# Patient Record
Sex: FEMALE | Race: WHITE | URBAN - METROPOLITAN AREA
[De-identification: names, ages, dates, MRNs, and addresses within clinical notes are randomized per-mention and may not be internally consistent; named-entity substitution may affect disease eponyms.]

---

## 2017-01-01 ENCOUNTER — MEDICAL CORRESPONDENCE (OUTPATIENT)
Dept: HEALTH INFORMATION MANAGEMENT | Facility: CLINIC | Age: 52
End: 2017-01-01

## 2017-01-01 ENCOUNTER — TELEPHONE (OUTPATIENT)
Dept: NEUROLOGY | Facility: CLINIC | Age: 52
End: 2017-01-01

## 2017-01-01 ENCOUNTER — OFFICE VISIT (OUTPATIENT)
Dept: NEUROLOGY | Facility: CLINIC | Age: 52
End: 2017-01-01

## 2017-01-01 ENCOUNTER — TRANSFERRED RECORDS (OUTPATIENT)
Dept: HEALTH INFORMATION MANAGEMENT | Facility: CLINIC | Age: 52
End: 2017-01-01

## 2017-01-01 ENCOUNTER — ALLIED HEALTH/NURSE VISIT (OUTPATIENT)
Dept: NEUROLOGY | Facility: CLINIC | Age: 52
End: 2017-01-01

## 2017-01-01 VITALS
SYSTOLIC BLOOD PRESSURE: 137 MMHG | HEART RATE: 94 BPM | WEIGHT: 222.6 LBS | BODY MASS INDEX: 38 KG/M2 | OXYGEN SATURATION: 96 % | DIASTOLIC BLOOD PRESSURE: 91 MMHG | HEIGHT: 64 IN

## 2017-01-01 DIAGNOSIS — Z71.9 VISIT FOR COUNSELING: Primary | ICD-10-CM

## 2017-01-01 DIAGNOSIS — G12.21 ALS (AMYOTROPHIC LATERAL SCLEROSIS) (H): ICD-10-CM

## 2017-01-01 DIAGNOSIS — G12.21 ALS (AMYOTROPHIC LATERAL SCLEROSIS) (H): Primary | ICD-10-CM

## 2017-01-01 LAB
EXPTIME-PRE: 7.23 SEC
FEF2575-%PRED-PRE: 83 %
FEF2575-PRE: 2.18 L/SEC
FEF2575-PRED: 2.6 L/SEC
FEFMAX-%PRED-PRE: 83 %
FEFMAX-PRE: 5.52 L/SEC
FEFMAX-PRED: 6.58 L/SEC
FEV1-%PRED-PRE: 97 %
FEV1-PRE: 2.59 L
FEV1FEV6-PRE: 78 %
FEV1FEV6-PRED: 82 %
FEV1FVC-PRE: 77 %
FEV1FVC-PRED: 80 %
FIFMAX-PRE: 5.13 L/SEC
FVC-%PRED-PRE: 100 %
FVC-PRE: 3.35 L
FVC-PRED: 3.33 L
MEP-PRE: 105 CMH2O
MIP-PRE: -95 CMH2O

## 2017-01-01 RX ORDER — PERPHENAZINE/AMITRIPTYLINE HCL 4MG-10MG
TABLET ORAL 2 TIMES DAILY
COMMUNITY

## 2017-01-01 RX ORDER — COVID-19 ANTIGEN TEST
KIT MISCELLANEOUS
COMMUNITY
Start: 2015-12-18

## 2017-01-01 ASSESSMENT — ENCOUNTER SYMPTOMS
NERVOUS/ANXIOUS: 1
ABDOMINAL PAIN: 0
WEIGHT GAIN: 0
ALTERED TEMPERATURE REGULATION: 0
BOWEL INCONTINENCE: 0
SEIZURES: 0
DIZZINESS: 0
VOMITING: 0
TREMORS: 1
NUMBNESS: 1
PANIC: 0
DEPRESSION: 0
LOSS OF CONSCIOUSNESS: 0
BLOOD IN STOOL: 0
DECREASED APPETITE: 0
CHILLS: 1
CONSTIPATION: 0
HEARTBURN: 0
MEMORY LOSS: 0
TINGLING: 1
JAUNDICE: 0
NAUSEA: 0
HEADACHES: 1
INCREASED ENERGY: 0
DISTURBANCES IN COORDINATION: 0
FATIGUE: 1
POLYDIPSIA: 0
DIARRHEA: 1
INSOMNIA: 1
NIGHT SWEATS: 1
SPEECH CHANGE: 0
FEVER: 1
PARALYSIS: 0
DECREASED CONCENTRATION: 0
WEAKNESS: 1
WEIGHT LOSS: 0
RECTAL PAIN: 0
BLOATING: 0
POLYPHAGIA: 0
HALLUCINATIONS: 0
RECTAL BLEEDING: 0

## 2017-01-01 ASSESSMENT — PAIN SCALES - GENERAL: PAINLEVEL: MODERATE PAIN (4)

## 2017-01-13 NOTE — TELEPHONE ENCOUNTER
Social Work:  D/I: Pt was unable to come to appt this week due to the weather. She indicates that her bro who has been helping her get to the bathroom is having back/hernia issues. She had the CADI waiver assessment a few weeks ago. They are waiting for her MA to be processed. She rec'd a letter indicating what financial verifications are needed. She will have them together by early next week. I left a message for the CADI  Mirian [phone]. Will discuss with her the immediacy of the service need. Encouraged Pt to reach out to a PCA provider (she has the info) to start conversations about services. Also discussed having skilled home care RN, PT, OT, HHA and she agrees with that. She may need some additional equipment in her bathroom. She doesn't want to use [facility] home care. Found [facility] out of [place] on the Medicare website that has all the needed services and Pt agrees with referral to them. Angie Villanueva LPN will facilitate that.  A/P: PCA services needed asap. MA needs to be approved before services can start. That should occur in the next 1-2 weeks. Enc Pt to initiate contact with a PCA provider so that they have a person in place to be her PCA when MA is approved. Skilled home care to start in the meantime.

## 2017-01-30 NOTE — TELEPHONE ENCOUNTER
"Call from Home Care company who expressed concern for patient's safety as she is unable to do basic ADL's and in their opinion requires 24 hr care. Patient approved for 12 hrs of PCA however a provider has been unavailable thus far. Patient has refused much of the assistance offered, as well as possibility of Assisted Living opening. Patient scheduled to have \"[employer]\" set up in next 48 hrs. [facility]  is aware of patient's vulnerability.  "

## 2017-02-02 NOTE — Clinical Note
2017       RE: Kimi Jacobson Boeckman  [address]  [address]  [place] [address]     Dear Colleague,    Thank you for referring your patient, Kimi Jacobson Boeckman, to the [facility] NEUROLOGY at [facility]. Please see a copy of my visit note below.    2017        Felix Merritt MD   [facility]    [address] [place] [address]      RE: Kimi Jacobson Boeckman   MRN: [id number]    : 1965              Dear Dr. Merritt:        Kimi Jacobson Boeckman returned for followup at the [facility].  We last saw her in 2016.  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  Her first symptoms began more than 3 years ago.  She has not been doing so well because her leg weakness has progressed.  She does not live with her mother anymore.  She lives in an assisted living facility.  She has qualified for home care and home care has visited her including PT and OT. Unfortunately there was a lot of tension with the patient, because she felt that she was doing too much exercise.  She has had a few falls lately. Previously, family members including brother were helping her with transfer but now one of the family members got a hernia and has to be operated.  She denies dysphagia, dysarthria, sialorrhea, head drop, difficulty chewing, pseudobulbar affect or other cranial nerve symptoms.  She has no major dyspnea, orthopnea, nonrefreshing sleep or morning headaches.  She has upper extremity weakness which does not appear particularly disturbing or limiting.  She has a manual wheelchair.  A power wheelchair does not fit in her apartment.  She has been recently approved on a CADI waiver but there has been difficulty finding a caregiver/PCA through their local agency because they do not have one right now.  A former student who is a  neighbor and a nurse and her spouse decided to be her PCAs.  She has not completed any health care directive yet.  She does not take her riluzole because she had side effects on the medication.      Medications were reviewed and are as per Epic record.        On physical examination her blood pressure is 137/91.  Pulse 94 and regular.  She weighs 222 pounds, 20 more than her last visit.  Height is 5 feet 3.5.  BMI is 38.89.  Pain score 4 out of 10.  O2 saturation 96% on room air.      Her spirometry indicates a vital capacity of 3.35 liters or 100% predicted for age, height and sex.  FEV-1 is similar.  MIP -95 cm of water.   cm of water.  The numbers are slightly changed from last visit but still well within normal limits.  I did not repeat a neuro examination.     IMPRESSION: Fela has shown expected slow progression of her limb onset ALS with predominantly paraparesis compared to her last visit.  The main issues that arise have to do with homecare.  Based on my face-to-face encounter of 25 minutes today, I can attest that the patient requires 24-hour home care to allow basic activities of daily living to be accomplished, and for safety reasons.  We discussed with the patient the possibility of relocation to a nursing home or a higher level of care.  She will talk to our  today and meet our PT and OT therapists.  She does not have any bulbar or nutrition needs at present.   I also attest that she needs a power wheelchair to allow activities of daily living to be performed at her home.  This durable medical equipment is medically necessary and should be reimbursed based on Medicare criteria.  Her mobility problem is expected to worsen given that she has ALS, which is an incurable progressive neurodegenerative disorder.      Thank you for allowing me to participate in her care.  She will return to clinic for followup in about 6 months.       Sincerely,        Albino Sheldon MD        D:  2017 10:25   T: 2017 10:49   MT: tb      Name:     JACOBSON BOECKMAN, KIMI   MRN:      [id number]        Account:      [id number]   :      1965           Service Date: 2017      Document: [id number]

## 2017-02-02 NOTE — MR AVS SNAPSHOT
After Visit Summary   2/2/2017    Kimi Jacobson Boeckman    MRN: [id number]           Patient Information     Date Of Birth          1965        Visit Information        Provider Department      2/2/2017 10:00 AM Albino Sheldon MD [facility] Neurology        Today's Diagnoses     ALS (amyotrophic lateral sclerosis) (H)    -  1        Follow-ups after your visit        Your next 10 appointments already scheduled     Jun 08, 2017 10:00 AM   (Arrive by 9:45 AM)   Return ALS/Motor Neuron with Albino Sheldon MD   [facility] Neurology ([facility])    [address] [place] [id number] [phone]            Jun 08, 2017 10:00 AM   [facility]    [address] [place] [id number] [phone]              Who to contact     Please call your clinic at [phone] to:    Ask questions about your health    Make or cancel appointments    Discuss your medicines    Learn about your test results    Speak to your doctor   If you have compliments or concerns about an experience at your clinic, or if you wish to file a complaint, please contact [facility] Physicians Patient Relations at [phone] or email us at [email or web address]         Additional Information About Your Visit        MyChart Information     [employer] is an electronic gateway that provides easy, online access to your medical records. With [employer], you can request a clinic appointment, read your test results, renew a prescription or communicate with your care team.     To sign up for [employer] visit the website at [email or web address]   You will be asked to enter the access code listed below, as well as some personal information. Please follow the directions to create your username and password.     Your access code is:  [email or web address]  Expires: 5/3/2017  1:02 PM     Your access code will  in 90 days. If you need help or a new code, please contact your [facility] or call [phone] for assistance.        Care EveryWhere ID     This is your Care EveryWhere ID. This could be used by other organizations to access your [facility] medical records  [id number]        Your Vitals Were     Pulse Height Pulse Oximetry             94 1.613 m (5' 3.5\") 96%          Blood Pressure from Last 3 Encounters:   17 137/91   16 145/81   03/10/16 133/83    Weight from Last 3 Encounters:   16 93.214 kg (205 lb 8 oz)   16 91.627 kg (202 lb)   03/10/16 90.719 kg (200 lb)                 Today's Medication Changes          These changes are accurate as of: 17  1:02 PM.  If you have any questions, ask your nurse or doctor.               Start taking these medicines.        Dose/Directions    * order for DME   Used for:  ALS (amyotrophic lateral sclerosis) (H)   Started by:  Albino Sheldon MD        Equipment being ordered: Jaylen lift   Quantity:  1 Device   Refills:  0       * order for DME   Used for:  ALS (amyotrophic lateral sclerosis) (H)   Started by:  Albino Sheldon MD        Equipment being ordered: Gloves (please wear while working with patient).   Quantity:  1 Box   Refills:  1       * Notice:  This list has 2 medication(s) that are the same as other medications prescribed for you. Read the directions carefully, and ask your doctor or other care provider to review them with you.         Where to get your medicines      Some of these will need a paper prescription and others can be bought over the counter.  Ask your nurse if you have questions.     Bring a paper prescription for each of these medications    - order for DME  - order for DME             Primary Care Provider Office Phone # Fax #    Felix Merritt [phone]       ECHO " [facility] [address] [facility] [place] [place] [address]        Thank you!     Thank you for choosing [facility] NEUROLOGY  for your care. Our goal is always to provide you with excellent care. Hearing back from our patients is one way we can continue to improve our services. Please take a few minutes to complete the written survey that you may receive in the mail after your visit with us. Thank you!             Your Updated Medication List - Protect others around you: Learn how to safely use, store and throw away your medicines at www.disposemymeds.org.          This list is accurate as of: 2/2/17  1:02 PM.  Always use your most recent med list.                   Brand Name Dispense Instructions for use    acetaminophen-codeine 300-30 MG per tablet    TYLENOL #3     Take 1-2 tablets by mouth every 4 hours as needed for moderate pain       ALEVE 220 MG capsule   Generic drug:  naproxen sodium          B-12 1000 MCG Caps          CO Q 10 PO          Coconut Oil 1000 MG Caps      Take by mouth 2 times daily       Cranberry 400 MG Tabs          * EMERGEN-C IMMUNE PO          * MULTIVITAMIN & MINERAL PO          ESCITALOPRAM OXALATE PO      Take 5 mg by mouth daily       GLUCOSAMINE SULFATE PO          Lecithin Gran          MEGESTROL ACETATE PO      Take 40 mg by mouth 2 times daily       * order for DME     1 Device    Equipment being ordered: Jaylen lift       * order for DME     1 Box    Equipment being ordered: Gloves (please wear while working with patient).       WARFARIN SODIUM PO      Take 2.5 mg by mouth       * Notice:  This list has 4 medication(s) that are the same as other medications prescribed for you. Read the directions carefully, and ask your doctor or other care provider to review them with you.

## 2017-02-02 NOTE — NURSING NOTE
Chief Complaint   Patient presents with     RECHECK     [facility] RETURN ALS/MOTOR NEURON     Christina Hernandez MA

## 2017-02-03 NOTE — PROGRESS NOTES
Social Work:  D/I: Met w/Pt and her Mother Calli in f/u in ALS clinic. She has been approved for the CADI waiver program but has had a delay in finding a caregiver(s)/PCAs thru an agency. They are hard to find in her area. She has, however, a plan in the works. A former student (long ago) who is a neighbor and nurse and her spouse who is a CNA, both have decided to be her PCAs. They are working on becoming employed thru the PCA agency [facility] home care. Ivonne has 12 hours/day of PCA approved. They live 10 miles away.   Her CADI  is Milly Frias [phone]. Pt likes and trusts her. She's had experience with a relative with ALS.  Discussed a future plan for her care when she needs 24/hr. She indicated she doesn't know yet what she will do. She would like to stay in the area and wants to keep her dog. Milly is apparently looking at those options too. I will contact her to discuss.  Also discussed completing a health care directive. She said that her dtr hasn't supported her completing one because she doesn't want to lose her Mom. We reviewed the use and purpose of the directive. Enc her to chose a decision maker that would be able to follow her wishes. She has a copy. I also offered to assist her in completing it or doing it over the phone. She and her Mother were tearful when discussing her end of life.  A/P: Pt's mother will be relieved when services are in place for Ivonne. For the time being, her soon to be PCA's are helping her without reimbursement. Her mother visits frequently also to help as well as her brother.  Will contact her . Ivonne is aware she can contact me between visits as needed.

## 2017-02-04 NOTE — PROGRESS NOTES
Answers for HPI/ROS submitted by the patient on 2/2/2017   General Symptoms: Yes  Skin Symptoms: No  HENT Symptoms: No  EYE SYMPTOMS: No  HEART SYMPTOMS: No  LUNG SYMPTOMS: No  INTESTINAL SYMPTOMS: Yes  URINARY SYMPTOMS: No  GYNECOLOGIC SYMPTOMS: No  BREAST SYMPTOMS: No  SKELETAL SYMPTOMS: No  BLOOD SYMPTOMS: No  NERVOUS SYSTEM SYMPTOMS: Yes  MENTAL HEALTH SYMPTOMS: Yes  Fever: Yes  Loss of appetite: No  Weight loss: No  Weight gain: No  Fatigue: Yes  Night sweats: Yes  Chills: Yes  Increased stress: Yes  Excessive hunger: No  Excessive thirst: No  Feeling hot or cold when others believe the temperature is normal: No  Loss of height: No  Post-operative complications: No  Surgical site pain: No  Hallucinations: No  Change in or Loss of Energy: No  Hyperactivity: No  Confusion: No  Heart burn or indigestion: No  Nausea: No  Vomiting: No  Abdominal pain: No  Bloating: No  Constipation: No  Diarrhea: Yes  Blood in stool: No  Black stools: No  Rectal or Anal pain: No  Fecal incontinence: No  Rectal bleeding: No  Yellowing of skin or eyes: No  Vomit with blood: No  Change in stools: No  Hemorrhoids: No  Trouble with coordination: No  Dizziness or trouble with balance: No  Fainting or black-out spells: No  Memory loss: No  Headache: Yes  Seizures: No  Speech problems: No  Tingling: Yes  Tremor: Yes  Weakness: Yes  Difficulty walking: Yes  Paralysis: No  Numbness: Yes  Nervous or Anxious: Yes  Depression: No  Trouble sleeping: Yes  Trouble thinking or concentrating: No  Mood changes: No  Panic attacks: No

## 2017-03-27 NOTE — TELEPHONE ENCOUNTER
"Social Work:  D/I: F/u call with Pt to address her health care directive. She indicated that she is waiting to have the conversation with her dtr when she comes to visit. She is very sad and feeling neglected by her dtr. She lives in [place] with her SO and their 1 year old. She works full time and goes to school.   Ivonne was in the hospital for weakness shortly after her last appt here. She went to an adult foster care facility which she said was a \"nightmare\". She described the poor care and depression surroundings. She was placed there because it was the only place she could be with her dog. She has subsequently been DC back home and has 24 hour PCA's in place thru [facility]. She indicated she is having some difficulty adjusting to having someone there all of the time. She because very depressed and more anxious at the facility but she indicates it has improved. She was tearful on the phone today and with many complaints. She is not interested in counseling at home. She states she talks to her aunt and cousin a lot and that is helpful.  She is upset that she hasn't received her WC yet and indicated that paperwork here wasn't completed.   P: Will send a note to Margarette Mendez re the paperwork with [employer] for power WC.   Pt plans to ask her dtr if she wants to be her health care directive proxy. If not, then she will ask her aunt or cousin and move forward with completing the form. Offered to assist her with it by phone or at next appt if needed.   "